# Patient Record
Sex: MALE | Race: WHITE
[De-identification: names, ages, dates, MRNs, and addresses within clinical notes are randomized per-mention and may not be internally consistent; named-entity substitution may affect disease eponyms.]

---

## 2018-08-06 ENCOUNTER — HOSPITAL ENCOUNTER (EMERGENCY)
Dept: HOSPITAL 75 - ER | Age: 33
Discharge: HOME | End: 2018-08-06
Payer: COMMERCIAL

## 2018-08-06 VITALS — WEIGHT: 170 LBS | BODY MASS INDEX: 24.34 KG/M2 | HEIGHT: 70 IN

## 2018-08-06 VITALS — SYSTOLIC BLOOD PRESSURE: 116 MMHG | DIASTOLIC BLOOD PRESSURE: 75 MMHG

## 2018-08-06 DIAGNOSIS — S39.012A: Primary | ICD-10-CM

## 2018-08-06 DIAGNOSIS — Z88.2: ICD-10-CM
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
## 2018-08-06 PROCEDURE — 96372 THER/PROPH/DIAG INJ SC/IM: CPT

## 2018-08-06 PROCEDURE — 99284 EMERGENCY DEPT VISIT MOD MDM: CPT

## 2018-08-06 NOTE — ED BACK PAIN
General


Chief Complaint:  Back Problems


Stated Complaint:  LOWER BACK PAIN


Source of Information:  Patient


Exam Limitations:  No Limitations





History of Present Illness


Date Seen by Provider:  Aug 6, 2018


Time Seen by Provider:  11:52


Initial Comments


To ER with reports of left low back pain. This began about 20-30 minutes prior 

to arrival. He states that he was doing dead lifts at the gym when he had a 

sudden pain in the left low back. The pain is nonradiating down either leg, no 

loss of sensation of the genitals and no loss of bowel or bladder control. He 

was able to bend forward and dropped the weights, he was able to drive himself 

home and then here. He thought that this may be muscle spasm that he went home 

and drank a bunch of pickle juice and took an 800 mg ibuprofen tablet. However 

the pain persists.


Location:  Lumbar Spine, Paraspinous Muscles


Timing/Duration:  1/2 Hour


Severity:  Severe


Pain/Injury Location:  Back


Associated Symptoms:  No numbness in legs/feet, No tingling in legs/feet, No 

sensory/motor loss; lower back pain; No loss of bladder control, No loss of 

bowel control





Allergies and Home Medications


Allergies


Coded Allergies:  


     Sulfa (Sulfonamide Antibiotics) (Verified  Allergy, Unknown, 8/6/18)





Home Medications


Hydrocodone/Acetaminophen 1 Each Tablet, 1 EACH PO Q4H PRN for PAIN-SEVERE TO 

BREAKTHROUGH


   Prescribed by: ROSALBA BURGESS on 8/6/18 1157


Methocarbamol 750 Mg Tablet, 750 MG PO Q6H PRN for PAIN-MILD TO MODERATE


   Prescribed by: ROSALBA BURGESS on 8/6/18 1157





Patient Home Medication List


Home Medication List Reviewed:  Yes





Constitutional:  see HPI


EENTM:  see HPI


Respiratory:  no symptoms reported


Cardiovascular:  no symptoms reported


Genitourinary:  no symptoms reported


Musculoskeletal:  see HPI, back pain


Skin:  no symptoms reported


Psychiatric/Neurological:  No Symptoms Reported





Past Medical-Social-Family Hx


Patient Social History


Recent Foreign Travel:  No


Contact w/Someone Who Travel:  No





Physical Exam


Vital Signs





Vital Signs - First Documented








 8/6/18





 11:50


 


Temp 96.9


 


Pulse 84


 


Resp 20


 


B/P (MAP) 132/91 (105)


 


Pulse Ox 97





Capillary Refill :


Height, Weight, BMI


Height: '"


Weight: lbs. oz. kg;  BMI


Method:


General Appearance:  No Apparent Distress, WD/WN, Other (states as long as he 

stands perfectly still his pain is at only about a 5. However if he twists or 

bends the pain increases to an 8 out of 10.)


HEENT:  PERRL/EOMI, TMs Normal


Neck:  Full Range of Motion


Respiratory:  Normal Breath Sounds, No Accessory Muscle Use, No Respiratory 

Distress


Extremity:  Normal Capillary Refill, Normal Inspection


Neurologic/Psychiatric:  Alert, Oriented x3


Skin:  Normal Color, Warm/Dry





Progress/Results/Core Measures


Results/Orders


My Orders





Orders - ROSALBA BURGESS


Morphine  Injection (Morphine  Injection (8/6/18 12:00)


Orphenadrine Injection (Norflex Injectio (8/6/18 12:00)





Medications Given in ED





Current Medications








 Medications  Dose


 Ordered  Sig/Elizabeth


 Route  Start Time


 Stop Time Status Last Admin


Dose Admin


 


 Morphine Sulfate  8 mg  ONCE  PRN


 IJ  8/6/18 12:00


    8/6/18 12:22


8 MG


 


 Orphenadrine


 Citrate  60 mg  ONCE  ONCE


 IM  8/6/18 12:00


 8/6/18 12:01 DC 8/6/18 12:22


60 MG








Vital Signs/I&O











 8/6/18





 11:50


 


Temp 96.9


 


Pulse 84


 


Resp 20


 


B/P (MAP) 132/91 (105)


 


Pulse Ox 97











Departure


Communication (Admissions)


1300-Feeling better. Will dc to home.





Impression





 Primary Impression:  


 Back strain


Disposition:  01 HOME, SELF-CARE


Condition:  Stable





Departure-Patient Inst.


Decision time for Depature:  11:55


Referrals:  


NO,LOCAL PHYSICIAN (PCP/Family)


Primary Care Physician


Patient Instructions:  Lumbar Muscle Strain (DC)





Add. Discharge Instructions:  


1. Take muscle relaxers in addition to the anti-inflammatory ibuprofen. You may 

use either heat or ice, whichever is more comfortable to the area. No dead 

lifting or bending at the waist for 3-4 days. He may also take the hydrocodone 

for severe pain 








All discharge instructions reviewed with patient and/or family. Voiced 

understanding.


Scripts


Methocarbamol (Robaxin-750) 750 Mg Tablet


750 MG PO Q6H PRN for PAIN-MILD TO MODERATE, #14 TAB


   Prov: ROSALBA BURGESS         8/6/18 


Hydrocodone/Acetaminophen (Norco 5-325 Tablet) 1 Each Tablet


1 EACH PO Q4H PRN for PAIN-SEVERE TO BREAKTHROUGH, #10 TAB


   Prov: ROSALBA BURGESS         8/6/18


Work/School Note:  Work Release Form   Date Seen in the Emergency Department:  

Aug 6, 2018


   Return to Work:  Aug 7, 2018











ROSALBA BURGESS Aug 6, 2018 11:57

## 2018-08-06 NOTE — XMS REPORT
Continuity of Care Document

 Created on: 2018



Zane Sher

External Reference #: [id number]

: 1985

Sex: Male



Demographics







 Address  [address] [place] [address]  [id number]

 

 Home Phone  [phone] x

 

 Preferred Language  Unknown

 

 Marital Status  Unknown

 

 Druze Affiliation  Unknown

 

 Race  Unknown

 

 Ethnic Group  Unknown





Author







 Author  [facility]

 

 Organization  [facility]

 

 Address  Unknown

 

 Phone  Unavailable



              



Allergies

      



There is no data.                  



Medications

      



There is no data.                  



Problems

      



There is no data.                  



Procedures

      



There is no data.                  



Results

      



There is no data.              



Encounters

      





 ACCT No.            Visit Date/Time            Discharge            Status    
        Pt. Type            Provider            Facility            Loc./Unit  
          Complaint        

 

 [id number]            2017 17:06:59                         [facility]            
Unknown

## 2021-10-22 ENCOUNTER — HOSPITAL ENCOUNTER (EMERGENCY)
Dept: HOSPITAL 75 - ER | Age: 36
LOS: 1 days | Discharge: HOME | End: 2021-10-23
Payer: COMMERCIAL

## 2021-10-22 VITALS — BODY MASS INDEX: 24.3 KG/M2 | HEIGHT: 70.08 IN | WEIGHT: 169.76 LBS

## 2021-10-22 VITALS — DIASTOLIC BLOOD PRESSURE: 93 MMHG | SYSTOLIC BLOOD PRESSURE: 159 MMHG

## 2021-10-22 DIAGNOSIS — S62.314A: Primary | ICD-10-CM

## 2021-10-22 DIAGNOSIS — S62.316A: ICD-10-CM
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
## 2021-10-22 PROCEDURE — 99283 EMERGENCY DEPT VISIT LOW MDM: CPT

## 2021-10-22 PROCEDURE — 73130 X-RAY EXAM OF HAND: CPT

## 2021-10-22 NOTE — DIAGNOSTIC IMAGING REPORT
INDICATION: Right hand pain at the base of the 4th and 5th

metacarpals.



No comparison is available.



FINDINGS: On the PA view, the appearance of the right hand and

wrist appear appropriate. On the oblique and lateral views,

however, there are abnormal relationships demonstrated of the

base of the metacarpals and the adjacent carpals and there appear

to be fractures of the base of both the 4th and 5th metacarpals.

These findings would be better assessed with a CT through the

right hand for further characterization.



No other fractures or malalignment are evident.



IMPRESSION: Abnormal subluxation with apparent fractures

involving the base of the 4th and 5th metacarpals. There appear

to be abnormal relationships of the carpometacarpal joints on the

lateral view. The findings could be further characterized with CT

imaging.



Dictated by: 



  Dictated on workstation # [id number]
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
## 2022-02-22 ENCOUNTER — HOSPITAL ENCOUNTER (OUTPATIENT)
Dept: HOSPITAL 75 - RAD | Age: 37
End: 2022-02-22
Attending: UROLOGY
Payer: COMMERCIAL

## 2022-02-22 DIAGNOSIS — I86.1: ICD-10-CM

## 2022-02-22 DIAGNOSIS — N43.40: Primary | ICD-10-CM

## 2022-02-22 DIAGNOSIS — N50.3: ICD-10-CM

## 2022-02-22 PROCEDURE — 76870 US EXAM SCROTUM: CPT

## 2022-02-22 NOTE — DIAGNOSTIC IMAGING REPORT
INDICATION:  VARICOCELE, QUESTIONABLE SPERMATOCELE



TECHNIQUE: Real-time grayscale sonographic imaging and color

vascular evaluation of the scrotum. 



CORRELATION STUDY:  None



FINDINGS: 

RIGHT TESTICLE:  5.2 x 2.6 x 2.4 cm. 

LEFT TESTICLE:  4.6 x 2.5 x 3.2 cm. 



The testicles are in normal location and demonstrate homogeneous

echotexture.  There is vascular flow to the testicles.  

There is relatively simple appearing right epididymal head cyst,

1.8 x 1.3 x 1.6 cm. Left epididymis unremarkable.  

No significant hydrocele and/or varicoceles.



IMPRESSION: 

1.  Prominent but simple appearing nearly 2 cm right epididymal

head cyst.

2. Testicles appear unremarkable with presence of normal blood

flow.

3. No significant hydrocele or varicocele demonstrated at this

time.



Dictated by: 



  Dictated on workstation # [id number]